# Patient Record
Sex: FEMALE | Race: WHITE
[De-identification: names, ages, dates, MRNs, and addresses within clinical notes are randomized per-mention and may not be internally consistent; named-entity substitution may affect disease eponyms.]

---

## 2018-12-25 ENCOUNTER — HOSPITAL ENCOUNTER (EMERGENCY)
Dept: HOSPITAL 80 - FED | Age: 58
Discharge: HOME | End: 2018-12-25
Payer: COMMERCIAL

## 2018-12-25 VITALS — DIASTOLIC BLOOD PRESSURE: 93 MMHG | SYSTOLIC BLOOD PRESSURE: 128 MMHG
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
## 2018-12-25 DIAGNOSIS — S43.102A: Primary | ICD-10-CM
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
## 2018-12-25 PROCEDURE — A4565 SLINGS: HCPCS

## 2018-12-25 NOTE — EDPHY
H & P


Time Seen by Provider: 12/25/18 18:21


HPI/ROS: 





CHIEF COMPLAINT:  Left clavicle pain





HISTORY OF PRESENT ILLNESS:  58-year-old female via private vehicle complaining 

of acute left clavicle and shoulder pain after she was walking, slipped and 

impacted said area.  No paresthesia.  No head injury.  This was a mechanical 

episode, not a syncopal episode .





PRIMARY CARE PROVIDER:  





REVIEW OF SYSTEMS:


A ten point review of systems was performed and is negative with the exception 

of the items mentioned in the HPI





************


PHYSICAL EXAM





(Prior to examination, patient consented to physical exam, hands were washed 

and my usual and customary physical exam procedures followed)


1) GENERAL: Well-developed, well-nourished, alert and oriented.  Appears to be 

in no acute distress.


2) HEAD: Normocephalic


3) HEENT: Pupils equal, round, reactive to light bilaterally.  


4) LUNGS: Breathing comfortably.   


5) MUSCULOSKELETAL:  Step-off at the left acromioclavicular joint.  Intact 

skin.  No axillary nerve dysfunction.  Soft compartments.  Normal coloration.


6) SKIN:  Tacked


7) VASCULAR:  pulses and cap refill present are brisk


8) NEUROLOGIC:  Radial, ulnar, median nerve function intact with no deficits 

appreciated on exam 








***************





DIFFERENTIAL DIAGNOSIS:   in no particular order including but not limited to 

fracture, sprain, compartment syndrome





********











Procedure:  Splint 





A left upper extremity sling  splint was applied by ER technician.   After 

application of the splint I returned and re-examined the patient.  The splint 

was adequately immobilizing the joint and distal to the splint the patient's 

circulation and sensation were intact.  Patient shows no signs of compartment 

syndrome.  Was given orthopedic precautions.





Smoking Status: Never smoked


Constitutional: 


 Initial Vital Signs











Temperature (C)  36.6 C   12/25/18 18:07


 


Heart Rate  72   12/25/18 18:07


 


Respiratory Rate  18   12/25/18 18:07


 


Blood Pressure  128/93 H  12/25/18 18:07


 


O2 Sat (%)  99   12/25/18 18:07








 











O2 Delivery Mode               Room Air














Allergies/Adverse Reactions: 


 





No Known Allergies Allergy (Unverified 12/25/18 18:10)


 








Home Medications: 














 Medication  Instructions  Recorded


 


Hydrocodone/APAP 5/325 [Norco 1 tab PO Q6 PRN #7 tab 12/25/18





5/325 (RX)]  














MDM/Departure





- MDM


Imaging Results: 


 Imaging Impressions





Shoulder X-Ray  12/25/18 18:10


Impression: Grade 2 separation suspected left AC joint.








Images reviewed myself


ED Course/Re-evaluation: 





Re-evaluation with serial exams.  Discussed her imaging results.  Discussed 

limitations of x-ray.  Informed that non osseous injury not ruled out.  

Recommended orthopedic follow-up.  I think the patient can be discharged with 

orthopedic follow-up.  Given my usual and customary orthopedic precautions and 

instructions.  Care of patient under supervision of  secondary supervising 

physician Dr Toth .





- Depart


Disposition: Home, Routine, Self-Care


Clinical Impression: 


Separation of left acromioclavicular joint, type 2


Qualifiers:


 Encounter type: initial encounter Qualified Code(s): S43.102A - Unspecified 

dislocation of left acromioclavicular joint, initial encounter





Condition: Good


Instructions:  Acromioclavicular Separation (ED)


Additional Instructions: 


Return to the ER immediately if you experience discoloration, have worsening 

pain, numbness, tingling, or any other symptoms that concern you.  If you 

received x-rays in the emergency department today, be advised, that ligamentous

, tendon, muscular, and other non-bony injury cannot be fully ruled out. Try to 

keep your affected extremity elevated above the level of your chest, and keep 

cold packs on the affected area, for the next 48 hours.


Prescriptions: 


Hydrocodone/APAP 5/325 [Norco 5/325 (RX)] 1 tab PO Q6 PRN #7 tab


 PRN Reason: Pain, Severe


Referrals: 


Kurtis Strange MD [Medical Doctor] - 5-7 days, call for appt.